# Patient Record
Sex: MALE | Race: OTHER | HISPANIC OR LATINO | URBAN - METROPOLITAN AREA
[De-identification: names, ages, dates, MRNs, and addresses within clinical notes are randomized per-mention and may not be internally consistent; named-entity substitution may affect disease eponyms.]

---

## 2022-12-04 ENCOUNTER — EMERGENCY (EMERGENCY)
Facility: HOSPITAL | Age: 27
LOS: 1 days | Discharge: LEFT BEFORE DISCHARGE | End: 2022-12-04
Attending: EMERGENCY MEDICINE | Admitting: EMERGENCY MEDICINE
Payer: SELF-PAY

## 2022-12-04 VITALS
TEMPERATURE: 98 F | HEART RATE: 88 BPM | OXYGEN SATURATION: 99 % | HEIGHT: 63 IN | RESPIRATION RATE: 16 BRPM | SYSTOLIC BLOOD PRESSURE: 144 MMHG | DIASTOLIC BLOOD PRESSURE: 80 MMHG | WEIGHT: 244.93 LBS

## 2022-12-04 PROCEDURE — 99284 EMERGENCY DEPT VISIT MOD MDM: CPT

## 2022-12-04 PROCEDURE — 99282 EMERGENCY DEPT VISIT SF MDM: CPT

## 2022-12-04 NOTE — ED PROVIDER NOTE - OBJECTIVE STATEMENT
27 year old male with PMHx of colitis presents to the ED complaining of pain s/p breaking molar 3 days ago. Pt bought over the counter filling for tooth yesterday, used overnight, and had more pain today. Pt states Tylenol has not relieved his pain, and he is unable to take Motrin secondary to colitis.

## 2022-12-04 NOTE — ED ADULT NURSE NOTE - CADM POA CENTRAL LINE
Patient Education     Abdominal Pain, Possible Appendicitis (Child)  Abdominal (stomach) pain is common in children. One possible cause of this pain is an inflamed appendix. The appendix is a small sac attached to the large intestine (colon). If it becomes blocked by stool or undigested food, it may become infected and swollen. This condition is called appendicitis.  Appendicitis can be hard to diagnose because stomach pain can have many causes. The healthcare provider must rule out other possible causes of the pain. A child with stomach pain might stay in the hospital for evaluation. Lab and imaging tests may be done. If appendicitis is confirmed, surgery often needs to be done right away to remove the appendix.  Symptoms  The most common symptom of appendicitis is pain in the abdomen. Since the appendix is in the lower right part of the abdomen, that is the most common place to have pain. Typically, the pain starts near the navel (belly button) and then moves lower and to the right over hours. The pain also tends to worsen over time. It may hurt especially when moving, straining, or coughing.  Other symptoms include:  · Loss of appetite  · Nausea, vomiting  · Low-grade fever  · Constipation or diarrhea  · Not passing gas  While waiting for a diagnosis  · Frequent re-testing may be needed until a diagnosis is made or the pain goes away. Note: Your child may not be allowed to eat before the tests. Be sure your child follows any instructions given. If your child is allowed to eat, give only light food to start, and not too much at one time. Avoid fatty, fried, or spicy foods.  · Your child may be given IV pain medicine. Let the provider know how well the medicine is working. Also let the provider know if the pain appears to go away, even for a short while.  · Comfort your child as needed. Hold your child. Or encourage your child to find positions that ease his or her discomfort. Distractions such as music or reading  aloud may also help.  Follow-up care  Follow up with your child’s healthcare provider as directed. If testing was done, you’ll be told the results when they are ready. Depending on what is found, treatment may be needed.  When to seek medical advice  Unless your child’s healthcare provider advises otherwise, call the provider right away if:  · Your child is 3 months old or younger and has a fever of 100.4°F (38°C) or higher. (Seek treatment right away. Fever in a young baby can be a sign of a serious infection.)  · Your child is younger than 2 years of age and has a fever of 100.4°F (38°C) that lasts for more than 1 day.  · Your child is 2 years old or older and has a fever of 100.4°F (38°C) that lasts for more than 3 days.  · Your child has repeated fevers above 104°F (40°C).  Also call the provider right away if:  · Your child’s pain worsens or doesn’t improve.  · Your child’s pain is suddenly relieved.  · Your child has increased nausea or vomiting.  · Your child has a swollen belly.  Call 911  Call 911 right away if:  · Your child has trouble breathing.  · Your child becomes very confused or hard to wake up.  · Your child faints.  · Your child has an unusually fast heart rate.  © 1666-3497 [employer]. [address] [place] [address], [place] [address]. All rights reserved. This information is not intended as a substitute for professional medical care. Always follow your healthcare professional's instructions.            No

## 2022-12-04 NOTE — ED PROVIDER NOTE - NS_ ATTENDINGSCRIBEDETAILS _ED_A_ED_FT
Michael Schwartz MD - The scribe's documentation has been prepared under my direction and personally reviewed by me in its entirety. I confirm that the note above accurately reflects all work, treatment, procedures, and medical decision making performed by me.

## 2024-08-29 NOTE — ED PROVIDER NOTE - NS_EDPROVIDERDISPOUSERTYPE_ED_A_ED
Juan is back following his surgery last week and he has had no great difficulties during that time.  As expected his ear creating more discomfort than his nose but he was able to breathe through his nose better than with previous surgeries even with the splints and.  Exam shows no evidence of infection of the ears cleansed and healing is very appropriate there is no hematoma and the contours are good.  The nose is examined following removal of the stents and he has a terrific airway dimension.  The knots across the columella are trimmed.  We talked about continuing saline irrigation and cleansing of the alar margins.  He will see us in 6 weeks and will get photos at that time.  His mom is with him today and suggested that his brother will be coming to see us as well.BRYSON DELGADO MD    Scribe Attestation (For Scribes USE Only)... Attending Attestation (For Attendings USE Only).../Scribe Attestation (For Scribes USE Only)...